# Patient Record
Sex: FEMALE | Race: WHITE | NOT HISPANIC OR LATINO | Employment: OTHER | URBAN - NONMETROPOLITAN AREA
[De-identification: names, ages, dates, MRNs, and addresses within clinical notes are randomized per-mention and may not be internally consistent; named-entity substitution may affect disease eponyms.]

---

## 2021-07-30 ENCOUNTER — OFFICE VISIT (OUTPATIENT)
Dept: CARDIOLOGY | Facility: CLINIC | Age: 46
End: 2021-07-30

## 2021-07-30 VITALS
WEIGHT: 129 LBS | HEART RATE: 73 BPM | SYSTOLIC BLOOD PRESSURE: 114 MMHG | DIASTOLIC BLOOD PRESSURE: 68 MMHG | BODY MASS INDEX: 21.49 KG/M2 | HEIGHT: 65 IN

## 2021-07-30 DIAGNOSIS — R94.31 NONSPECIFIC ST-T WAVE ELECTROCARDIOGRAPHIC CHANGES: ICD-10-CM

## 2021-07-30 DIAGNOSIS — R51.9 CHRONIC NONINTRACTABLE HEADACHE, UNSPECIFIED HEADACHE TYPE: ICD-10-CM

## 2021-07-30 DIAGNOSIS — G89.29 CHRONIC NONINTRACTABLE HEADACHE, UNSPECIFIED HEADACHE TYPE: ICD-10-CM

## 2021-07-30 DIAGNOSIS — R00.2 PALPITATIONS: Primary | ICD-10-CM

## 2021-07-30 PROCEDURE — 93000 ELECTROCARDIOGRAM COMPLETE: CPT | Performed by: NURSE PRACTITIONER

## 2021-07-30 PROCEDURE — 99204 OFFICE O/P NEW MOD 45 MIN: CPT | Performed by: NURSE PRACTITIONER

## 2021-07-30 NOTE — PROGRESS NOTES
Subjective   Chula Light is a 46 y.o. female seen in the office today for initial cardiac evaluation of palpitations. About 2 to 3 months ago she developed palpitations in the form of fluttering. This sensation will sometimes radiate to her throat. The intensity of the palpitations has not increased but the frequency has. Initially she felt the discomfort after eating meals most often midday to evening. Now the palpitations are occurring randomly. Usually they will last a few seconds then stop and restart. This pattern can persist for up to an hour. No associated symptoms are noted. Specifically, no chest pain, dizziness or lightheadedness or inappropriate shortness of breath noted. She exercises routinely, high-impact cardio, and does not feel any palpitations or symptoms during that time. Her past medical history includes migraines and former very light smoker having stopped 3 years ago. She has had a hemorrhoidectomy and appendectomy in the past without problem. Her health history is negative for diabetes, thyroid problems, or pulmonary issues. Currently she is on no medications. In the past she took a medication for migraine management but has not taken anything for several years. Recent lab results were unremarkable. Her family history includes father having had hypertension, hypercholesterolemia, and passed from MI at age 63. Mother is colon cancer survivor otherwise no health issues.         Chief Complaint   Patient presents with   • Establish Care     Referred per PCP for palpitations, evaluate for holter monitor    • LABS     Had labs June 2021 , on chart.   • Palpitations     Has palptations  , have before more frequent lately   • Med Refill     Patient does not take any prescribed or OTC  medications at this time.       Initial cardiac evaluation;      Cardiac History  History reviewed. No pertinent surgical history.    No current outpatient medications on file.     No current facility-administered  medications for this visit.       Patient has no known allergies.    Past Medical History:   Diagnosis Date   • Abnormal ECG    • History of appendectomy        Social History     Socioeconomic History   • Marital status:      Spouse name: Not on file   • Number of children: Not on file   • Years of education: Not on file   • Highest education level: Not on file   Tobacco Use   • Smoking status: Never Smoker   • Smokeless tobacco: Never Used   Vaping Use   • Vaping Use: Never used   Substance and Sexual Activity   • Alcohol use: Yes     Alcohol/week: 2.0 standard drinks     Types: 2 Glasses of wine per week     Comment: Daily   • Drug use: Never       Family History   Problem Relation Age of Onset   • Colon cancer Mother    • Heart attack Father    • No Known Problems Brother        Review of Systems   Constitutional: Negative for activity change, appetite change and fatigue.   HENT: Negative for congestion, hearing loss, postnasal drip and sore throat.    Eyes: Negative for visual disturbance.   Respiratory: Negative for cough, choking, chest tightness, shortness of breath and wheezing.    Cardiovascular: Positive for palpitations. Negative for chest pain and leg swelling.   Gastrointestinal: Negative for abdominal pain, blood in stool, constipation, diarrhea, nausea and vomiting.   Endocrine: Negative for cold intolerance and heat intolerance.   Genitourinary: Negative for flank pain, frequency, hematuria and urgency.   Musculoskeletal: Negative for arthralgias, back pain, joint swelling and myalgias.   Skin: Negative.    Neurological: Positive for headaches. Negative for dizziness, tremors, seizures, syncope, speech difficulty, weakness, light-headedness and numbness.   Hematological: Does not bruise/bleed easily.   Psychiatric/Behavioral: Negative for confusion, hallucinations and sleep disturbance. The patient is not nervous/anxious.        BP Readings from Last 5 Encounters:   07/30/21 114/68       Wt  "Readings from Last 5 Encounters:   07/30/21 58.5 kg (129 lb)          Objective      Labs 6/14/2021 per PCP: TSH 1.13, C-reactive protein 0.37, total cholesterol 215, triglycerides 59, , , glucose 89, BUN 20, creatinine 0.69, , potassium 4.6, chloride 102, CO2 20, calcium 9.3, total protein 7.7, albumin 4.8, total globulin 2.9, total bili 0.4, ALP 53, AST 18, ALT 10    /68 (BP Location: Right arm)   Pulse 73   Ht 165.1 cm (65\")   Wt 58.5 kg (129 lb)   BMI 21.47 kg/m²     Physical Exam  Vitals and nursing note reviewed.   Constitutional:       Appearance: Normal appearance.   HENT:      Head: Normocephalic.      Nose: Nose normal.      Mouth/Throat:      Pharynx: Oropharynx is clear.   Eyes:      Conjunctiva/sclera: Conjunctivae normal.   Neck:      Thyroid: No thyromegaly or thyroid tenderness.      Vascular: No carotid bruit.   Cardiovascular:      Rate and Rhythm: Normal rate and regular rhythm.      Pulses: Normal pulses.      Heart sounds: Normal heart sounds.   Pulmonary:      Effort: Pulmonary effort is normal.      Breath sounds: Normal breath sounds.   Abdominal:      General: Abdomen is flat.      Palpations: Abdomen is soft.   Musculoskeletal:      Cervical back: Neck supple.   Skin:     General: Skin is warm and dry.      Coloration: Skin is not jaundiced or pale.   Neurological:      Mental Status: She is alert and oriented to person, place, and time.   Psychiatric:         Mood and Affect: Mood normal.         Behavior: Behavior normal.           ECG 12 Lead    Date/Time: 7/30/2021 11:13 AM  Performed by: Christie Saenz APRN  Authorized by: Christie Saenz APRN   Previous ECG: no previous ECG available  Rhythm: sinus rhythm  BPM: 73  Other findings: non-specific ST-T wave changes  Comments: TX 20 ms, QTc 407 ms            Assessment/Plan     Diagnoses and all orders for this visit:    1. Palpitations (Primary)  -     Adult Transthoracic Echo Complete W/ Cont if " Necessary Per Protocol; Future  -     Holter Monitor - 72 Hour Up To 15 Days; Future  -     ECG 12 Lead    2. Chronic nonintractable headache, unspecified headache type  -     Adult Transthoracic Echo Complete W/ Cont if Necessary Per Protocol; Future    3. Nonspecific ST-T wave electrocardiographic changes  -     ECG 12 Lead      Palpitations-72-hour cardiac monitor placed to evaluate for arrhythmia causing the symptom.  Recent labs reviewed showing normal thyroid and chemistry.  She maintains healthy lifestyle including diet and exercise.    History of headache-echocardiogram with bubble study advised to assess for PFO or shunting of blood as causative factor for chronic headaches or any structural issue causing palpitations.    EKG today shows sinus rhythm with nonspecific ST-T wave changes.  She maintains regular high-impact cardio exercise without any symptoms.  I did not order treadmill stress test at this time.  Should other testing be abnormal then further testing will be considered.    Follow-up visit will be based on test results and prn basis. Please call sooner for cardiac concerns.               Electronically signed by JAGJIT Ibarra,  July 30, 2021 11:28 EDT

## 2021-08-19 ENCOUNTER — APPOINTMENT (OUTPATIENT)
Dept: CARDIOLOGY | Facility: HOSPITAL | Age: 46
End: 2021-08-19